# Patient Record
Sex: FEMALE | Race: WHITE | NOT HISPANIC OR LATINO | Employment: FULL TIME | URBAN - METROPOLITAN AREA
[De-identification: names, ages, dates, MRNs, and addresses within clinical notes are randomized per-mention and may not be internally consistent; named-entity substitution may affect disease eponyms.]

---

## 2021-10-18 ENCOUNTER — HOSPITAL ENCOUNTER (EMERGENCY)
Facility: CLINIC | Age: 26
Discharge: HOME | End: 2021-10-18
Attending: NURSE PRACTITIONER | Admitting: NURSE PRACTITIONER
Payer: OTHER MISCELLANEOUS

## 2021-10-18 VITALS
DIASTOLIC BLOOD PRESSURE: 62 MMHG | SYSTOLIC BLOOD PRESSURE: 112 MMHG | RESPIRATION RATE: 16 BRPM | TEMPERATURE: 97 F | OXYGEN SATURATION: 100 % | HEART RATE: 77 BPM

## 2021-10-18 DIAGNOSIS — S51.811A LACERATION OF RIGHT FOREARM, INITIAL ENCOUNTER: ICD-10-CM

## 2021-10-18 DIAGNOSIS — S51.819A FOREARM LACERATION: ICD-10-CM

## 2021-10-18 PROCEDURE — 99283 EMERGENCY DEPT VISIT LOW MDM: CPT

## 2021-10-18 PROCEDURE — 12002 RPR S/N/AX/GEN/TRNK2.6-7.5CM: CPT

## 2021-10-18 ASSESSMENT — ENCOUNTER SYMPTOMS
WOUND: 1
NUMBNESS: 0
WEAKNESS: 0

## 2021-10-18 NOTE — ED PROVIDER NOTES
History   Chief Complaint:  Laceration       HPI   Dai Tellez is a 25 year old female who presents with right forearm laceration on a metal edge at work less than an hour ago.  Her last tetanus booster was in 2017.       Review of Systems   Skin: Positive for wound.   Neurological: Negative for weakness and numbness.   All other systems reviewed and are negative.      Allergies:  Amoxicillin    Medications:  Effexor   Alesse    Past Medical History:     GERD  [place]  Allergies  Depression  IBS    Social History:  Presents with female visitor.    Physical Exam     Patient Vitals for the past 24 hrs:   BP Temp Temp src Pulse Resp SpO2   10/18/21 1807 112/62 97  F (36.1  C) Temporal 77 16 100 %       Physical Exam  General: Alert, No obvious discomfort, well kept   HENT:  Normal voice, No lymphadenopathy  Eyes:  The pupils are equal, round, and reactive to light, Conjunctiva normal, No scleral icterus   Neck:  Normal range of motion  CV:  Normal Pulses, Normal cap refill  Resp:  Non-labored, No cough  MS:  3.5 cm laceration of right forearm, No indication for Flexor or extensor tendon injury, Normal ROM if all digits  Skin:  No rash or acute skin lesions noted  Neuro:  Speech is normal and fluent, Normal sensation  Psych: Awake. Alert.  Mildly anxious.  Appropriate interactions. Good eye contact    Emergency Department Course     Procedures    Laceration Repair        LACERATION:  A simple clean 3 1/2 cm laceration.      LOCATION:  Right anterior forearm      FUNCTION:  Distally sensation, circulation, motor and tendon function are intact.      ANESTHESIA:  Local using 0.5% bupivocaine total of 3.5 mLs      PREPARATION:  Irrigation and Scrubbing with Normal Saline and Shur Clens      DEBRIDEMENT:  no debridement      CLOSURE:  Wound was closed with One Layer.  Skin closed with 7 x 4.0 Ethylon using running sutures.    Emergency Department Course:  Reviewed:  I reviewed nursing notes, vitals, past medical history  and Care Everywhere    Assessments:  1810 I obtained history and examined the patient as noted above.   1847 I rechecked the patient and explained findings.     Disposition:  The patient was discharged to home.     Impression & Plan     Medical Decision Making:   Findings and exam were consistent with uncomplicated laceration of right forearm. The wound was carefully evaluated and explored. Was repaired as noted above. There is no evidence at this time of bony injury, foreign body, deep space infection, or neurovascular injury. Follow up in 3-5 days time if concerns over healing. Possible complications (infection, scarring) were reviewed with the patient. The patient is to follow-up for suture removal in 10 to 14 days. Indications for immediate return to ER/UR were reviewed and included but are not limited to, redness, fevers, drainage, increasing pain, high fevers, or other concerns.       Diagnosis:    ICD-10-CM    1. Forearm laceration  S51.819A        Discharge Medications:  New Prescriptions    No medications on file       Scribe Disclosure:  Alissa FLORES, am serving as a scribe at 6:09 PM on 10/18/2021 to document services personally performed by Stephan Ewing APRN based on my observations and the provider's statements to me.            Stephan Ewing APRN CNP  10/18/21 1915

## (undated) RX ORDER — BUPIVACAINE HYDROCHLORIDE 5 MG/ML
INJECTION, SOLUTION PERINEURAL
Status: DISPENSED
Start: 2021-10-18